# Patient Record
(demographics unavailable — no encounter records)

---

## 2025-02-03 NOTE — PHYSICAL EXAM
[Chaperone Present] : A chaperone was present in the examining room during all aspects of the physical examination [15106] : A chaperone was present during the pelvic exam. [Appropriately responsive] : appropriately responsive [Alert] : alert [No Acute Distress] : no acute distress [No Lymphadenopathy] : no lymphadenopathy [Regular Rate Rhythm] : regular rate rhythm [No Murmurs] : no murmurs [Clear to Auscultation B/L] : clear to auscultation bilaterally [Soft] : soft [Non-tender] : non-tender [Non-distended] : non-distended [No HSM] : No HSM [No Lesions] : no lesions [No Mass] : no mass [Oriented x3] : oriented x3 [Labia Majora] : normal [Labia Minora] : normal [Normal] : normal [Enlarged ___ wks] : enlarged [unfilled] ~Uweeks [Uterine Adnexae] : normal [FreeTextEntry2] : Kirsten

## 2025-02-03 NOTE — DISCUSSION/SUMMARY
[FreeTextEntry1] : A - WWV       IUPat 8 weeks 3 days  P  f/u 1 month      P 3013    EDC 9/12/25     pt has PNV     initial prenatal labs done     f/u 1 month for NIPS and NT.

## 2025-02-03 NOTE — HISTORY OF PRESENT ILLNESS
[FreeTextEntry1] : 31 y.o. P 3013  LNMP,  24 presents for annual exam and confirmation of pregnancy ,pt feels well, PmH- negative,  PSHx- negative,   FH_ negative,  SH- negative,  GYN hx- MIssed ab x 1 ,  OB hx-  x 3.  pt is taking PNV gummies